# Patient Record
Sex: MALE | Race: WHITE | NOT HISPANIC OR LATINO | Employment: FULL TIME | URBAN - METROPOLITAN AREA
[De-identification: names, ages, dates, MRNs, and addresses within clinical notes are randomized per-mention and may not be internally consistent; named-entity substitution may affect disease eponyms.]

---

## 2017-05-05 ENCOUNTER — OFFICE VISIT (OUTPATIENT)
Dept: OPTOMETRY | Facility: CLINIC | Age: 27
End: 2017-05-05
Payer: COMMERCIAL

## 2017-05-05 DIAGNOSIS — T15.02XA CORNEAL FOREIGN BODY, LEFT, INITIAL ENCOUNTER: Primary | ICD-10-CM

## 2017-05-05 DIAGNOSIS — S05.02XS CORNEAL ABRASION, LEFT, SEQUELA: ICD-10-CM

## 2017-05-05 PROCEDURE — 92002 INTRM OPH EXAM NEW PATIENT: CPT | Mod: 25,S$GLB,, | Performed by: OPTOMETRIST

## 2017-05-05 PROCEDURE — 99999 PR PBB SHADOW E&M-NEW PATIENT-LVL II: CPT | Mod: PBBFAC,,, | Performed by: OPTOMETRIST

## 2017-05-05 PROCEDURE — 65222 REMOVE FOREIGN BODY FROM EYE: CPT | Mod: LT,S$GLB,, | Performed by: OPTOMETRIST

## 2017-05-05 RX ORDER — ERYTHROMYCIN 5 MG/G
OINTMENT OPHTHALMIC
Qty: 1 TUBE | Refills: 0 | Status: SHIPPED | OUTPATIENT
Start: 2017-05-05

## 2017-05-05 RX ORDER — CIPROFLOXACIN HYDROCHLORIDE 3 MG/ML
1 SOLUTION/ DROPS OPHTHALMIC
Qty: 5 ML | Refills: 0 | Status: SHIPPED | OUTPATIENT
Start: 2017-05-05 | End: 2017-05-12

## 2017-05-05 NOTE — PROGRESS NOTES
HPI     CC: Pt here today for eye irration and possible foreign body in left eye.   Pt was grinding metal last night and feels like something is in his left   eye. Pt states when he woke up this morning left eye was crusted over. No   pain but discomfort. Pt has had no vision changes.     (-) pain / (+) discomfort  (-) headaches  (-) diplopia   (-) flashes / (-) floaters         Last edited by Demarcus Palacios on 5/5/2017  9:21 AM.     ROS     Positive for: Eyes    Negative for: Constitutional, Gastrointestinal, Neurological, Skin,   Genitourinary, Musculoskeletal, HENT, Endocrine, Cardiovascular,   Respiratory, Psychiatric, Allergic/Imm, Heme/Lymph    Last edited by Julio César Martin, OD on 5/5/2017  9:55 AM. (History)        Assessment /Plan     For exam results, see Encounter Report.    Corneal foreign body, left, initial encounter    Corneal abrasion, left, sequela  -     ciprofloxacin HCl (CILOXAN) 0.3 % ophthalmic solution; Place 1 drop into the left eye every hour.  Dispense: 5 mL; Refill: 0  -     erythromycin (ROMYCIN) ophthalmic ointment; 1/4 inch ribbon in lower left lid at bedtime  Dispense: 1 Tube; Refill: 0      Discussed findings and treatment options. Pt consents to procedure. Proparcaine 0.5% instilled in office OS. Corneal metal FB, removed in office with sterile spud. Remaining rust ring removed with sterile Shelley Brush in office without complications. Corneal abrasion sequela remaining. 1 gt Vigamox given to in office.     Start Ciloxan: 1 gt q15 min x first hour, then 1 gt q30 min x second hour, then 1 gt q1-2 hrs until bedtime today. Can decrease to 1 gt q4-6 hrs tomorrow and Sunday. Erythromycin into lower left lid at bedtime x 3 days. Return on Monday for f/u, or sooner if symptoms worsen.     Discussed discomfort today following procedure secondary to abrasion. Recommend OTC nsaid as needed for pain.

## 2018-06-08 ENCOUNTER — TELEPHONE (OUTPATIENT)
Dept: OPHTHALMOLOGY | Facility: CLINIC | Age: 28
End: 2018-06-08

## 2018-06-08 NOTE — TELEPHONE ENCOUNTER
----- Message from Ally Reich sent at 6/8/2018  9:36 AM CDT -----  Contact: Self  Type:  Same Day Appointment Request    Caller is requesting a same day appointment.  Caller declined first available appointment listed below.      Name of Caller:  Patient  When is the first available appointment?  Wouldn't let me schedule send red dot  Symptoms: Yellow puss and swollen right eye  Best Call Back Number:  [phone] (home)   Additional Information:   Last time he had this he had an infection behind the eye.